# Patient Record
Sex: MALE | Race: WHITE | Employment: UNEMPLOYED | URBAN - NONMETROPOLITAN AREA
[De-identification: names, ages, dates, MRNs, and addresses within clinical notes are randomized per-mention and may not be internally consistent; named-entity substitution may affect disease eponyms.]

---

## 2023-09-03 ENCOUNTER — HOSPITAL ENCOUNTER (EMERGENCY)
Age: 18
Discharge: HOME | End: 2023-09-03
Attending: EMERGENCY MEDICINE
Payer: COMMERCIAL

## 2023-09-03 VITALS
HEART RATE: 64 BPM | SYSTOLIC BLOOD PRESSURE: 131 MMHG | BODY MASS INDEX: 23.62 KG/M2 | TEMPERATURE: 98.3 F | DIASTOLIC BLOOD PRESSURE: 77 MMHG | WEIGHT: 190 LBS | OXYGEN SATURATION: 97 % | HEIGHT: 75 IN | RESPIRATION RATE: 14 BRPM

## 2023-09-03 DIAGNOSIS — R05.2 SUBACUTE COUGH: ICD-10-CM

## 2023-09-03 DIAGNOSIS — R21 RASH AND OTHER NONSPECIFIC SKIN ERUPTION: Primary | ICD-10-CM

## 2023-09-03 PROCEDURE — 6360000002 HC RX W HCPCS: Performed by: EMERGENCY MEDICINE

## 2023-09-03 PROCEDURE — 6370000000 HC RX 637 (ALT 250 FOR IP): Performed by: EMERGENCY MEDICINE

## 2023-09-03 PROCEDURE — 99283 EMERGENCY DEPT VISIT LOW MDM: CPT

## 2023-09-03 RX ORDER — CIPROFLOXACIN 500 MG/1
500 TABLET, FILM COATED ORAL 2 TIMES DAILY
Qty: 14 TABLET | Refills: 0 | Status: SHIPPED | OUTPATIENT
Start: 2023-09-03 | End: 2023-09-04 | Stop reason: SDUPTHER

## 2023-09-03 RX ORDER — DEXAMETHASONE SODIUM PHOSPHATE 10 MG/ML
10 INJECTION, SOLUTION INTRAMUSCULAR; INTRAVENOUS ONCE
Status: COMPLETED | OUTPATIENT
Start: 2023-09-03 | End: 2023-09-03

## 2023-09-03 RX ORDER — CETIRIZINE HYDROCHLORIDE 10 MG/1
10 TABLET ORAL ONCE
Status: COMPLETED | OUTPATIENT
Start: 2023-09-03 | End: 2023-09-03

## 2023-09-03 RX ORDER — CALAMINE 8% AND ZINC OXIDE 8% 160 MG/ML
LOTION TOPICAL
Qty: 177 ML | Refills: 2 | Status: SHIPPED | OUTPATIENT
Start: 2023-09-03 | End: 2023-09-04 | Stop reason: SDUPTHER

## 2023-09-03 RX ORDER — FAMOTIDINE 20 MG/1
20 TABLET, FILM COATED ORAL ONCE
Status: COMPLETED | OUTPATIENT
Start: 2023-09-03 | End: 2023-09-03

## 2023-09-03 RX ORDER — DOXYCYCLINE HYCLATE 100 MG
100 TABLET ORAL 2 TIMES DAILY
Qty: 14 TABLET | Refills: 0 | Status: SHIPPED | OUTPATIENT
Start: 2023-09-03 | End: 2023-09-04 | Stop reason: SDUPTHER

## 2023-09-03 RX ORDER — DOXYCYCLINE HYCLATE 100 MG
100 TABLET ORAL ONCE
Status: COMPLETED | OUTPATIENT
Start: 2023-09-03 | End: 2023-09-03

## 2023-09-03 RX ORDER — BENZONATATE 200 MG/1
200 CAPSULE ORAL 3 TIMES DAILY PRN
Qty: 21 CAPSULE | Refills: 0 | Status: SHIPPED | OUTPATIENT
Start: 2023-09-03 | End: 2023-09-04 | Stop reason: SDUPTHER

## 2023-09-03 RX ORDER — FAMOTIDINE 20 MG/1
20 TABLET, FILM COATED ORAL 2 TIMES DAILY
Qty: 28 TABLET | Refills: 0 | Status: SHIPPED | OUTPATIENT
Start: 2023-09-03 | End: 2023-09-04 | Stop reason: SDUPTHER

## 2023-09-03 RX ORDER — CETIRIZINE HYDROCHLORIDE 10 MG/1
10 TABLET ORAL DAILY
Qty: 30 TABLET | Refills: 0 | Status: SHIPPED | OUTPATIENT
Start: 2023-09-03 | End: 2023-09-04 | Stop reason: SDUPTHER

## 2023-09-03 RX ORDER — CIPROFLOXACIN 500 MG/1
500 TABLET, FILM COATED ORAL ONCE
Status: COMPLETED | OUTPATIENT
Start: 2023-09-03 | End: 2023-09-03

## 2023-09-03 RX ADMIN — FAMOTIDINE 20 MG: 20 TABLET, FILM COATED ORAL at 04:11

## 2023-09-03 RX ADMIN — CIPROFLOXACIN 500 MG: 500 TABLET, FILM COATED ORAL at 04:12

## 2023-09-03 RX ADMIN — DOXYCYCLINE HYCLATE 100 MG: 100 TABLET, COATED ORAL at 04:12

## 2023-09-03 RX ADMIN — DEXAMETHASONE SODIUM PHOSPHATE 10 MG: 10 INJECTION, SOLUTION INTRAMUSCULAR; INTRAVENOUS at 04:12

## 2023-09-03 RX ADMIN — CETIRIZINE HYDROCHLORIDE 10 MG: 10 TABLET, FILM COATED ORAL at 04:11

## 2023-09-03 ASSESSMENT — PAIN - FUNCTIONAL ASSESSMENT: PAIN_FUNCTIONAL_ASSESSMENT: NONE - DENIES PAIN

## 2023-09-03 NOTE — ED NOTES
[facility] he is from [place] and recently traveled to [place].        Shaylee Cool RN  09/03/23 [id number]

## 2023-09-03 NOTE — DISCHARGE INSTRUCTIONS
Take the antibiotics as prescribed. As far as the antihistamines, you can start with Pepcid and Zyrtec to see if this helps with the itching but if its not helping can move to Benadryl. Also use hydrocortisone topically to see if this does help with the itching. If this is not helping, you can move to calamine.

## 2023-09-03 NOTE — ED PROVIDER NOTES
Triage Chief Complaint:    Rash and Cough    HPI   Addison Giraldo is a 21 y.o. male that presents for evaluation of diffuse rash across his body. He states it started in his groin and seem to spread everywhere. The patient is reported that its been ongoing for the last few weeks or so. He feels as though nothing he does is really helping it. He does report that he had a cough for the last few weeks as well. He went to urgent care and was told this will likely resolve on its own. Did not sound as though he had pneumonia. Patient does report the cough seems to be getting better. He has not had any recent antibiotics. Denies any recent steroids. He has not been exposed any hot tubs but he does report swimming in fresh water when he was in [place] recently. The rash started since he came back from [place]. Nobody else around him has a similar type of rash. With father out of the room I did ask if the patient had any concerns about sexual transmitted diseases but the patient has stated no he does not have any concerns at this time. No urinary symptoms. No change in bowel habits. No fevers. Denies any neck pain or stiffness. History from : Patient and Family father    Limitations to history : None    ROS:  10 systems reviewed and negative except as above. History reviewed. No pertinent past medical history. History reviewed. No pertinent surgical history. History reviewed. No pertinent family history.   Social History     Socioeconomic History    Marital status: Single     Spouse name: Not on file    Number of children: Not on file    Years of education: Not on file    Highest education level: Not on file   Occupational History    Not on file   Tobacco Use    Smoking status: Never     Passive exposure: Never    Smokeless tobacco: Never   Substance and Sexual Activity    Alcohol use: Not Currently    Drug use: Never    Sexual activity: Not on file   Other Topics Concern    Not on file   Social History

## 2023-09-04 RX ORDER — CIPROFLOXACIN 500 MG/1
500 TABLET, FILM COATED ORAL 2 TIMES DAILY
Qty: 14 TABLET | Refills: 0 | Status: SHIPPED | OUTPATIENT
Start: 2023-09-04 | End: 2023-09-11

## 2023-09-04 RX ORDER — CETIRIZINE HYDROCHLORIDE 10 MG/1
10 TABLET ORAL DAILY
Qty: 30 TABLET | Refills: 0 | Status: SHIPPED | OUTPATIENT
Start: 2023-09-04

## 2023-09-04 RX ORDER — DOXYCYCLINE HYCLATE 100 MG
100 TABLET ORAL 2 TIMES DAILY
Qty: 14 TABLET | Refills: 0 | Status: SHIPPED | OUTPATIENT
Start: 2023-09-04 | End: 2023-09-11

## 2023-09-04 RX ORDER — FAMOTIDINE 20 MG/1
20 TABLET, FILM COATED ORAL 2 TIMES DAILY
Qty: 28 TABLET | Refills: 0 | Status: SHIPPED | OUTPATIENT
Start: 2023-09-04 | End: 2023-09-18

## 2023-09-04 RX ORDER — CALAMINE 8% AND ZINC OXIDE 8% 160 MG/ML
LOTION TOPICAL
Qty: 177 ML | Refills: 2 | Status: SHIPPED | OUTPATIENT
Start: 2023-09-04

## 2023-09-04 RX ORDER — BENZONATATE 200 MG/1
200 CAPSULE ORAL 3 TIMES DAILY PRN
Qty: 21 CAPSULE | Refills: 0 | Status: SHIPPED | OUTPATIENT
Start: 2023-09-04 | End: 2023-09-11